# Patient Record
Sex: FEMALE | Race: BLACK OR AFRICAN AMERICAN | NOT HISPANIC OR LATINO | URBAN - METROPOLITAN AREA
[De-identification: names, ages, dates, MRNs, and addresses within clinical notes are randomized per-mention and may not be internally consistent; named-entity substitution may affect disease eponyms.]

---

## 2018-08-01 ENCOUNTER — OUTPATIENT (OUTPATIENT)
Dept: OUTPATIENT SERVICES | Facility: HOSPITAL | Age: 58
LOS: 1 days | End: 2018-08-01
Payer: MEDICAID

## 2018-08-01 PROCEDURE — G9001: CPT

## 2018-08-16 ENCOUNTER — EMERGENCY (EMERGENCY)
Facility: HOSPITAL | Age: 58
LOS: 0 days | Discharge: HOME | End: 2018-08-17
Attending: EMERGENCY MEDICINE
Payer: MEDICAID

## 2018-08-16 VITALS
WEIGHT: 149.91 LBS | RESPIRATION RATE: 17 BRPM | DIASTOLIC BLOOD PRESSURE: 79 MMHG | OXYGEN SATURATION: 95 % | SYSTOLIC BLOOD PRESSURE: 150 MMHG | TEMPERATURE: 98 F | HEART RATE: 86 BPM | HEIGHT: 64.5 IN

## 2018-08-16 DIAGNOSIS — R10.9 UNSPECIFIED ABDOMINAL PAIN: ICD-10-CM

## 2018-08-16 DIAGNOSIS — M54.9 DORSALGIA, UNSPECIFIED: ICD-10-CM

## 2018-08-16 DIAGNOSIS — Z91.040 LATEX ALLERGY STATUS: ICD-10-CM

## 2018-08-16 DIAGNOSIS — R07.9 CHEST PAIN, UNSPECIFIED: ICD-10-CM

## 2018-08-16 DIAGNOSIS — I10 ESSENTIAL (PRIMARY) HYPERTENSION: ICD-10-CM

## 2018-08-16 DIAGNOSIS — Z88.0 ALLERGY STATUS TO PENICILLIN: ICD-10-CM

## 2018-08-16 PROCEDURE — 99284 EMERGENCY DEPT VISIT MOD MDM: CPT | Mod: 25

## 2018-08-16 NOTE — ED PROVIDER NOTE - PHYSICAL EXAMINATION
Gen: Alert, c/o pain  Head: NC, AT, EOMI, normal lids/conjunctiva  ENT: normal hearing, patent oropharynx, MMM  Neck: supple, no tenderness/meningismus, FROM, Trachea midline  Pulm: Bilateral clear BS, normal resp effort, no wheeze/stridor/retractions  CV: RRR, no M/R/G, +dist pulses  Abd: soft, NT/ND, +BS, no guarding/rebound tenderness  Mskel: no edema/erythema/cyanosis  Skin: no rash  Neuro: AAOx3, no sensory/motor deficits

## 2018-08-16 NOTE — ED PROVIDER NOTE - MEDICAL DECISION MAKING DETAILS
Pt improved in ED w meds given, ambulating in ED.  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  Pt eating in ED.  Discussed results and outcome of testing with the patient, given copy as well.  Patient advised to please follow up with their primary care doctor within the next 24 hours and return to the Emergency Department for worsening symptoms or any other concerns.  Patient advised that their doctor may call  to follow up on the specific results of the tests performed today in the emergency department.

## 2018-08-16 NOTE — ED PROVIDER NOTE - OBJECTIVE STATEMENT
Pertinent PMH/PSH/FHx/SHx and Review of Systems contained within:    58yo F w PMH of HTN, s/p lumbar fusion presents to ED c/o exacerbation of back pain radiating to abd & chest.  Denies fever, chills, SOB, vomiting, diarrhea.  Pt states she took her regular home meds w/o relief.    No fever/chills, No photophobia/eye pain/changes in vision, No ear pain/sore throat/dysphagia, No palpitations, no SOB/cough/wheeze/stridor, No abdominal pain, No neck pain, no rash, no changes in neurological status/function.

## 2018-08-17 VITALS
RESPIRATION RATE: 18 BRPM | HEART RATE: 90 BPM | SYSTOLIC BLOOD PRESSURE: 130 MMHG | TEMPERATURE: 99 F | OXYGEN SATURATION: 97 % | DIASTOLIC BLOOD PRESSURE: 71 MMHG

## 2018-08-17 DIAGNOSIS — Z98.1 ARTHRODESIS STATUS: Chronic | ICD-10-CM

## 2018-08-17 LAB
ALBUMIN SERPL ELPH-MCNC: 3.8 G/DL — SIGNIFICANT CHANGE UP (ref 3.3–5)
ALP SERPL-CCNC: 107 U/L — SIGNIFICANT CHANGE UP (ref 40–120)
ALT FLD-CCNC: 23 U/L — SIGNIFICANT CHANGE UP (ref 12–78)
ANION GAP SERPL CALC-SCNC: 10 MMOL/L — SIGNIFICANT CHANGE UP (ref 5–17)
APTT BLD: 28 SEC — SIGNIFICANT CHANGE UP (ref 27.5–37.4)
AST SERPL-CCNC: 22 U/L — SIGNIFICANT CHANGE UP (ref 15–37)
BASOPHILS # BLD AUTO: 0.03 K/UL — SIGNIFICANT CHANGE UP (ref 0–0.2)
BASOPHILS NFR BLD AUTO: 0.3 % — SIGNIFICANT CHANGE UP (ref 0–2)
BILIRUB SERPL-MCNC: 0.2 MG/DL — SIGNIFICANT CHANGE UP (ref 0.2–1.2)
BUN SERPL-MCNC: 20 MG/DL — SIGNIFICANT CHANGE UP (ref 7–23)
CALCIUM SERPL-MCNC: 9 MG/DL — SIGNIFICANT CHANGE UP (ref 8.5–10.1)
CHLORIDE SERPL-SCNC: 106 MMOL/L — SIGNIFICANT CHANGE UP (ref 96–108)
CK MB BLD-MCNC: 1.8 % — SIGNIFICANT CHANGE UP (ref 0–3.5)
CK MB CFR SERPL CALC: 3.6 NG/ML — SIGNIFICANT CHANGE UP (ref 0.5–3.6)
CK SERPL-CCNC: 197 U/L — HIGH (ref 26–192)
CO2 SERPL-SCNC: 29 MMOL/L — SIGNIFICANT CHANGE UP (ref 22–31)
CREAT SERPL-MCNC: 0.86 MG/DL — SIGNIFICANT CHANGE UP (ref 0.5–1.3)
D DIMER BLD IA.RAPID-MCNC: <150 NG/ML DDU — SIGNIFICANT CHANGE UP
EOSINOPHIL # BLD AUTO: 0.03 K/UL — SIGNIFICANT CHANGE UP (ref 0–0.5)
EOSINOPHIL NFR BLD AUTO: 0.3 % — SIGNIFICANT CHANGE UP (ref 0–6)
GLUCOSE SERPL-MCNC: 100 MG/DL — HIGH (ref 70–99)
HCT VFR BLD CALC: 36.9 % — SIGNIFICANT CHANGE UP (ref 34.5–45)
HGB BLD-MCNC: 12.7 G/DL — SIGNIFICANT CHANGE UP (ref 11.5–15.5)
IMM GRANULOCYTES NFR BLD AUTO: 0.3 % — SIGNIFICANT CHANGE UP (ref 0–1.5)
INR BLD: 1 RATIO — SIGNIFICANT CHANGE UP (ref 0.88–1.16)
LYMPHOCYTES # BLD AUTO: 2.42 K/UL — SIGNIFICANT CHANGE UP (ref 1–3.3)
LYMPHOCYTES # BLD AUTO: 27.5 % — SIGNIFICANT CHANGE UP (ref 13–44)
MAGNESIUM SERPL-MCNC: 2.3 MG/DL — SIGNIFICANT CHANGE UP (ref 1.6–2.6)
MCHC RBC-ENTMCNC: 30.9 PG — SIGNIFICANT CHANGE UP (ref 27–34)
MCHC RBC-ENTMCNC: 34.4 GM/DL — SIGNIFICANT CHANGE UP (ref 32–36)
MCV RBC AUTO: 89.8 FL — SIGNIFICANT CHANGE UP (ref 80–100)
MONOCYTES # BLD AUTO: 0.53 K/UL — SIGNIFICANT CHANGE UP (ref 0–0.9)
MONOCYTES NFR BLD AUTO: 6 % — SIGNIFICANT CHANGE UP (ref 2–14)
NEUTROPHILS # BLD AUTO: 5.77 K/UL — SIGNIFICANT CHANGE UP (ref 1.8–7.4)
NEUTROPHILS NFR BLD AUTO: 65.6 % — SIGNIFICANT CHANGE UP (ref 43–77)
NRBC # BLD: 0 /100 WBCS — SIGNIFICANT CHANGE UP (ref 0–0)
PLATELET # BLD AUTO: 328 K/UL — SIGNIFICANT CHANGE UP (ref 150–400)
POTASSIUM SERPL-MCNC: 3.1 MMOL/L — LOW (ref 3.5–5.3)
POTASSIUM SERPL-SCNC: 3.1 MMOL/L — LOW (ref 3.5–5.3)
PROT SERPL-MCNC: 7.7 GM/DL — SIGNIFICANT CHANGE UP (ref 6–8.3)
PROTHROM AB SERPL-ACNC: 10.9 SEC — SIGNIFICANT CHANGE UP (ref 9.8–12.7)
RBC # BLD: 4.11 M/UL — SIGNIFICANT CHANGE UP (ref 3.8–5.2)
RBC # FLD: 12.3 % — SIGNIFICANT CHANGE UP (ref 10.3–14.5)
SODIUM SERPL-SCNC: 145 MMOL/L — SIGNIFICANT CHANGE UP (ref 135–145)
TROPONIN I SERPL-MCNC: <.015 NG/ML — SIGNIFICANT CHANGE UP (ref 0.01–0.04)
TROPONIN I SERPL-MCNC: <.015 NG/ML — SIGNIFICANT CHANGE UP (ref 0.01–0.04)
WBC # BLD: 8.81 K/UL — SIGNIFICANT CHANGE UP (ref 3.8–10.5)
WBC # FLD AUTO: 8.81 K/UL — SIGNIFICANT CHANGE UP (ref 3.8–10.5)

## 2018-08-17 PROCEDURE — 74177 CT ABD & PELVIS W/CONTRAST: CPT | Mod: 26

## 2018-08-17 RX ORDER — POTASSIUM CHLORIDE 20 MEQ
40 PACKET (EA) ORAL ONCE
Qty: 0 | Refills: 0 | Status: COMPLETED | OUTPATIENT
Start: 2018-08-17 | End: 2018-08-17

## 2018-08-17 RX ORDER — KETOROLAC TROMETHAMINE 30 MG/ML
30 SYRINGE (ML) INJECTION ONCE
Qty: 0 | Refills: 0 | Status: DISCONTINUED | OUTPATIENT
Start: 2018-08-17 | End: 2018-08-17

## 2018-08-17 RX ORDER — ACETAMINOPHEN 500 MG
1000 TABLET ORAL ONCE
Qty: 0 | Refills: 0 | Status: COMPLETED | OUTPATIENT
Start: 2018-08-17 | End: 2018-08-17

## 2018-08-17 RX ORDER — DEXAMETHASONE 0.5 MG/5ML
6 ELIXIR ORAL ONCE
Qty: 0 | Refills: 0 | Status: COMPLETED | OUTPATIENT
Start: 2018-08-17 | End: 2018-08-17

## 2018-08-17 RX ADMIN — Medication 101.2 MILLIGRAM(S): at 01:18

## 2018-08-17 RX ADMIN — Medication 30 MILLIGRAM(S): at 07:56

## 2018-08-17 RX ADMIN — Medication 6 MILLIGRAM(S): at 02:00

## 2018-08-17 RX ADMIN — Medication 1000 MILLIGRAM(S): at 06:10

## 2018-08-17 RX ADMIN — Medication 40 MILLIEQUIVALENT(S): at 05:22

## 2018-08-17 RX ADMIN — Medication 30 MILLIGRAM(S): at 01:18

## 2018-08-17 RX ADMIN — Medication 30 MILLIGRAM(S): at 01:40

## 2018-08-17 RX ADMIN — Medication 400 MILLIGRAM(S): at 05:55

## 2018-08-17 NOTE — ED ADULT NURSE REASSESSMENT NOTE - NS ED NURSE REASSESS COMMENT FT1
Pt aware of plan to discharge, paperwork discussed with pt. Iv discontinued. Awaiting transportation services and breakfast.

## 2018-08-17 NOTE — ED ADULT NURSE NOTE - NSIMPLEMENTINTERV_GEN_ALL_ED
Implemented All Universal Safety Interventions:  [place] to call system. Call bell, personal items and telephone within reach. Instruct patient to call for assistance. Room bathroom lighting operational. Non-slip footwear when patient is off stretcher. Physically safe environment: no spills, clutter or unnecessary equipment. Stretcher in lowest position, wheels locked, appropriate side rails in place.

## 2018-08-17 NOTE — ED ADULT NURSE NOTE - OBJECTIVE STATEMENT
Pt a& o x3 BIBA. Pt present with back pain. 0016 pt took 3 Tylenol and tizanidine for pain.  l L2 - s1 fusion is spine 2011, t11 and t12 and L1 ablation of the spine in february 2018. pt states pain worsening today in back radiating across abdomen and chest 10/10. pt states she cannot take opioid medication/,. pt takes tizanidine, meloxicam and 3 g Tylenol.

## 2018-08-23 DIAGNOSIS — Z71.89 OTHER SPECIFIED COUNSELING: ICD-10-CM

## 2018-12-01 ENCOUNTER — OUTPATIENT (OUTPATIENT)
Dept: OUTPATIENT SERVICES | Facility: HOSPITAL | Age: 58
LOS: 1 days | End: 2018-12-01
Payer: MEDICAID

## 2018-12-01 DIAGNOSIS — Z98.1 ARTHRODESIS STATUS: Chronic | ICD-10-CM

## 2018-12-13 ENCOUNTER — INPATIENT (INPATIENT)
Facility: HOSPITAL | Age: 58
LOS: 1 days | Discharge: HOME | End: 2018-12-15
Attending: HOSPITALIST | Admitting: HOSPITALIST
Payer: MEDICAID

## 2018-12-13 VITALS
OXYGEN SATURATION: 97 % | HEART RATE: 89 BPM | TEMPERATURE: 99 F | SYSTOLIC BLOOD PRESSURE: 147 MMHG | DIASTOLIC BLOOD PRESSURE: 86 MMHG | RESPIRATION RATE: 16 BRPM

## 2018-12-13 DIAGNOSIS — Z98.1 ARTHRODESIS STATUS: Chronic | ICD-10-CM

## 2018-12-13 LAB
ALBUMIN SERPL ELPH-MCNC: 4.4 G/DL — SIGNIFICANT CHANGE UP (ref 3.3–5)
ALP SERPL-CCNC: 119 U/L — SIGNIFICANT CHANGE UP (ref 40–120)
ALT FLD-CCNC: 12 U/L — SIGNIFICANT CHANGE UP (ref 4–33)
APPEARANCE UR: CLEAR — SIGNIFICANT CHANGE UP
AST SERPL-CCNC: 20 U/L — SIGNIFICANT CHANGE UP (ref 4–32)
BACTERIA # UR AUTO: SIGNIFICANT CHANGE UP
BASOPHILS # BLD AUTO: 0.04 K/UL — SIGNIFICANT CHANGE UP (ref 0–0.2)
BASOPHILS NFR BLD AUTO: 0.8 % — SIGNIFICANT CHANGE UP (ref 0–2)
BILIRUB SERPL-MCNC: < 0.2 MG/DL — LOW (ref 0.2–1.2)
BILIRUB UR-MCNC: NEGATIVE — SIGNIFICANT CHANGE UP
BLOOD UR QL VISUAL: NEGATIVE — SIGNIFICANT CHANGE UP
BUN SERPL-MCNC: 12 MG/DL — SIGNIFICANT CHANGE UP (ref 7–23)
CALCIUM SERPL-MCNC: 9.7 MG/DL — SIGNIFICANT CHANGE UP (ref 8.4–10.5)
CHLORIDE SERPL-SCNC: 106 MMOL/L — SIGNIFICANT CHANGE UP (ref 98–107)
CO2 SERPL-SCNC: 28 MMOL/L — SIGNIFICANT CHANGE UP (ref 22–31)
COLOR SPEC: COLORLESS — SIGNIFICANT CHANGE UP
CREAT SERPL-MCNC: 0.81 MG/DL — SIGNIFICANT CHANGE UP (ref 0.5–1.3)
EOSINOPHIL # BLD AUTO: 0.14 K/UL — SIGNIFICANT CHANGE UP (ref 0–0.5)
EOSINOPHIL NFR BLD AUTO: 2.7 % — SIGNIFICANT CHANGE UP (ref 0–6)
EPI CELLS # UR: SIGNIFICANT CHANGE UP
GLUCOSE SERPL-MCNC: 92 MG/DL — SIGNIFICANT CHANGE UP (ref 70–99)
GLUCOSE UR-MCNC: NEGATIVE — SIGNIFICANT CHANGE UP
HCT VFR BLD CALC: 42.7 % — SIGNIFICANT CHANGE UP (ref 34.5–45)
HGB BLD-MCNC: 14.1 G/DL — SIGNIFICANT CHANGE UP (ref 11.5–15.5)
IMM GRANULOCYTES # BLD AUTO: 0.01 # — SIGNIFICANT CHANGE UP
IMM GRANULOCYTES NFR BLD AUTO: 0.2 % — SIGNIFICANT CHANGE UP (ref 0–1.5)
KETONES UR-MCNC: NEGATIVE — SIGNIFICANT CHANGE UP
LEUKOCYTE ESTERASE UR-ACNC: SIGNIFICANT CHANGE UP
LYMPHOCYTES # BLD AUTO: 2.29 K/UL — SIGNIFICANT CHANGE UP (ref 1–3.3)
LYMPHOCYTES # BLD AUTO: 43.9 % — SIGNIFICANT CHANGE UP (ref 13–44)
MCHC RBC-ENTMCNC: 30.5 PG — SIGNIFICANT CHANGE UP (ref 27–34)
MCHC RBC-ENTMCNC: 33 % — SIGNIFICANT CHANGE UP (ref 32–36)
MCV RBC AUTO: 92.2 FL — SIGNIFICANT CHANGE UP (ref 80–100)
MONOCYTES # BLD AUTO: 0.39 K/UL — SIGNIFICANT CHANGE UP (ref 0–0.9)
MONOCYTES NFR BLD AUTO: 7.5 % — SIGNIFICANT CHANGE UP (ref 2–14)
NEUTROPHILS # BLD AUTO: 2.35 K/UL — SIGNIFICANT CHANGE UP (ref 1.8–7.4)
NEUTROPHILS NFR BLD AUTO: 44.9 % — SIGNIFICANT CHANGE UP (ref 43–77)
NITRITE UR-MCNC: NEGATIVE — SIGNIFICANT CHANGE UP
NRBC # FLD: 0 — SIGNIFICANT CHANGE UP
PH UR: 7.5 — SIGNIFICANT CHANGE UP (ref 5–8)
PLATELET # BLD AUTO: 280 K/UL — SIGNIFICANT CHANGE UP (ref 150–400)
PMV BLD: 8.6 FL — SIGNIFICANT CHANGE UP (ref 7–13)
POTASSIUM SERPL-MCNC: 3.4 MMOL/L — LOW (ref 3.5–5.3)
POTASSIUM SERPL-SCNC: 3.4 MMOL/L — LOW (ref 3.5–5.3)
PROT SERPL-MCNC: 7.7 G/DL — SIGNIFICANT CHANGE UP (ref 6–8.3)
PROT UR-MCNC: NEGATIVE — SIGNIFICANT CHANGE UP
RBC # BLD: 4.63 M/UL — SIGNIFICANT CHANGE UP (ref 3.8–5.2)
RBC # FLD: 12.5 % — SIGNIFICANT CHANGE UP (ref 10.3–14.5)
RBC CASTS # UR COMP ASSIST: SIGNIFICANT CHANGE UP (ref 0–?)
SODIUM SERPL-SCNC: 144 MMOL/L — SIGNIFICANT CHANGE UP (ref 135–145)
SP GR SPEC: 1.01 — SIGNIFICANT CHANGE UP (ref 1–1.04)
UROBILINOGEN FLD QL: NORMAL — SIGNIFICANT CHANGE UP
WBC # BLD: 5.22 K/UL — SIGNIFICANT CHANGE UP (ref 3.8–10.5)
WBC # FLD AUTO: 5.22 K/UL — SIGNIFICANT CHANGE UP (ref 3.8–10.5)
WBC UR QL: SIGNIFICANT CHANGE UP (ref 0–?)

## 2018-12-13 PROCEDURE — 74177 CT ABD & PELVIS W/CONTRAST: CPT | Mod: 26

## 2018-12-13 RX ORDER — MORPHINE SULFATE 50 MG/1
4 CAPSULE, EXTENDED RELEASE ORAL ONCE
Qty: 0 | Refills: 0 | Status: DISCONTINUED | OUTPATIENT
Start: 2018-12-13 | End: 2018-12-13

## 2018-12-13 RX ORDER — OXYCODONE AND ACETAMINOPHEN 5; 325 MG/1; MG/1
1 TABLET ORAL ONCE
Qty: 0 | Refills: 0 | Status: DISCONTINUED | OUTPATIENT
Start: 2018-12-13 | End: 2018-12-13

## 2018-12-13 RX ORDER — CIPROFLOXACIN LACTATE 400MG/40ML
400 VIAL (ML) INTRAVENOUS ONCE
Qty: 0 | Refills: 0 | Status: COMPLETED | OUTPATIENT
Start: 2018-12-13 | End: 2018-12-13

## 2018-12-13 RX ORDER — KETOROLAC TROMETHAMINE 30 MG/ML
30 SYRINGE (ML) INJECTION ONCE
Qty: 0 | Refills: 0 | Status: DISCONTINUED | OUTPATIENT
Start: 2018-12-13 | End: 2018-12-13

## 2018-12-13 RX ORDER — LIDOCAINE 4 G/100G
1 CREAM TOPICAL ONCE
Qty: 0 | Refills: 0 | Status: COMPLETED | OUTPATIENT
Start: 2018-12-13 | End: 2018-12-13

## 2018-12-13 RX ORDER — ACETAMINOPHEN 500 MG
1000 TABLET ORAL ONCE
Qty: 0 | Refills: 0 | Status: COMPLETED | OUTPATIENT
Start: 2018-12-13 | End: 2018-12-14

## 2018-12-13 RX ADMIN — LIDOCAINE 1 PATCH: 4 CREAM TOPICAL at 19:35

## 2018-12-13 RX ADMIN — Medication 30 MILLIGRAM(S): at 19:35

## 2018-12-13 NOTE — ED ADULT NURSE NOTE - OBJECTIVE STATEMENT
pt received to intake room #7 with c/o back pain causing difficulty with ambulating. pt able to be assisted to wheelchair to use bathroom. pt placed on bedpan for urine sample in room. +ROM to b/l lower extremities. IV placed, labs drawn and sent. medication given as per MDs orders. pt to go to [facility].

## 2018-12-13 NOTE — ED PROVIDER NOTE - ATTENDING CONTRIBUTION TO CARE
Attending Statement: I have reviewed and agree with all pertinent clinical information, including history and physical exam and agree with treatment plan of the PA, except as noted.  57yo F hx of HTN, DM, asthma, chronic back pain sp spinal fusion BIBEMS co acute on chronic back pain x 2 days. States  "my back pain is worse  this time its affecting my LEgs" pt states she is unable to stand up straight due to pain. Has been on the floor for last two days. Denies any numbness or weakness. Denies any urine or bowel incontinence. no fall/trauma. Pain located on the right lower back radiating down the right buttocks. Pt states she is followed by pain management and get epidural every couple of weeks. Due to have "injection on 12-20-18" hasn't had any injection recently. Taking tylenol and meloxicam with min relief. not on AC.  Denies any abdominal pain. no dysuria no urgency   Vital signs noted. pt laying in bed with knees bend on the phone no distress. Ao3. nontoxic. looks comfortable. mmm. supple neck. no sign of facial trauma. normal S1-S2 No resp distress. able to speak in full and clear sentences. no wheeze, rales or stridor. soft no ecchymosis or bruising of abdomen, tender on the  right mid axillary line no rebound or guarding. no cvat. old vertical surgical scar well healed. no midline spine tenderness thoracic or lumbar spine no step off neg straight leg bl. pt able to turn herself in bed. able to straighten her LEGs and move her lower ext without assistance. nl flexion and extension bl. nl sensation bl lower ext.   plan labs, urine, ct abdomen, pain med and re assess

## 2018-12-13 NOTE — ED PROVIDER NOTE - MEDICAL DECISION MAKING DETAILS
Pt is a 57 y/o F nonsmoker PMHx asthma, HTN, DM, spinal fusion L4-S1 p/w acute on chronic back pain x 2 days. -- likely exacerbation of chronic back pain, no red flags -- pain control

## 2018-12-13 NOTE — ED PROVIDER NOTE - NONTENDER LOCATION
periumbilical/left lower quadrant/right lower quadrant/right upper quadrant/suprapubic/right costovertebral angle/left upper quadrant/umbilical/left costovertebral angle

## 2018-12-13 NOTE — ED ADULT NURSE NOTE - NSIMPLEMENTINTERV_GEN_ALL_ED
Implemented All Universal Safety Interventions:  [place] to call system. Call bell, personal items and telephone within reach. Instruct patient to call for assistance. Room bathroom lighting operational. Non-slip footwear when patient is off stretcher. Physically safe environment: no spills, clutter or unnecessary equipment. Stretcher in lowest position, wheels locked, appropriate side rails in place.

## 2018-12-13 NOTE — ED PROVIDER NOTE - PROGRESS NOTE DETAILS
pt pending ct to be done. will treat urine. endorse to Dr Sweet. MANA TELLES:  Pt unable to ambulate independently.  CT negative for any new acute pathology.  Pt admitted to Dr. Angulo.  MAR text paged at this time.

## 2018-12-13 NOTE — ED PROVIDER NOTE - OBJECTIVE STATEMENT
Pt is a 59 y/o F nonsmoker PMHx asthma, HTN, DM, spinal fusion L4-S1 p/w acute on chronic back pain x 2 days.  Pt states she has had ~ 2 years of chronic lower back pain radiating to right flank.  Pt states she had exacerbation of same pain for past 2 days, which worsens when trying to stand and walk.  Pt states she has been unable to walk 2/2 pain, but is otherwise able to move legs without any issues.  Pt states pain minimally improved with Meloxicam and Tylenol.  Pt notified pain management specialist Dr. Joe who directed pt to ED for evaluation and management.  Pt denies any fevers, chills, numbness, weakness, fecal/urinary incontinence, difficulty urinating or passing bowel movements, trauma, falls, illicit drug use, or any other specific complaints.

## 2018-12-14 DIAGNOSIS — R26.2 DIFFICULTY IN WALKING, NOT ELSEWHERE CLASSIFIED: ICD-10-CM

## 2018-12-14 DIAGNOSIS — N39.0 URINARY TRACT INFECTION, SITE NOT SPECIFIED: ICD-10-CM

## 2018-12-14 DIAGNOSIS — Z29.9 ENCOUNTER FOR PROPHYLACTIC MEASURES, UNSPECIFIED: ICD-10-CM

## 2018-12-14 DIAGNOSIS — M54.9 DORSALGIA, UNSPECIFIED: ICD-10-CM

## 2018-12-14 DIAGNOSIS — I10 ESSENTIAL (PRIMARY) HYPERTENSION: ICD-10-CM

## 2018-12-14 DIAGNOSIS — J45.909 UNSPECIFIED ASTHMA, UNCOMPLICATED: ICD-10-CM

## 2018-12-14 DIAGNOSIS — Z98.890 OTHER SPECIFIED POSTPROCEDURAL STATES: Chronic | ICD-10-CM

## 2018-12-14 DIAGNOSIS — G35 MULTIPLE SCLEROSIS: ICD-10-CM

## 2018-12-14 PROBLEM — M77.9 ENTHESOPATHY, UNSPECIFIED: Chronic | Status: ACTIVE | Noted: 2018-08-17

## 2018-12-14 PROBLEM — H26.9 UNSPECIFIED CATARACT: Chronic | Status: ACTIVE | Noted: 2018-08-17

## 2018-12-14 PROCEDURE — 99223 1ST HOSP IP/OBS HIGH 75: CPT

## 2018-12-14 PROCEDURE — 99221 1ST HOSP IP/OBS SF/LOW 40: CPT | Mod: GC

## 2018-12-14 RX ORDER — LIDOCAINE 4 G/100G
1 CREAM TOPICAL DAILY
Qty: 0 | Refills: 0 | Status: DISCONTINUED | OUTPATIENT
Start: 2018-12-14 | End: 2018-12-15

## 2018-12-14 RX ORDER — FAMOTIDINE 10 MG/ML
40 INJECTION INTRAVENOUS
Qty: 0 | Refills: 0 | Status: DISCONTINUED | OUTPATIENT
Start: 2018-12-14 | End: 2018-12-14

## 2018-12-14 RX ORDER — NIFEDIPINE 30 MG
1 TABLET, EXTENDED RELEASE 24 HR ORAL
Qty: 0 | Refills: 0 | COMMUNITY

## 2018-12-14 RX ORDER — CYCLOBENZAPRINE HYDROCHLORIDE 10 MG/1
10 TABLET, FILM COATED ORAL THREE TIMES A DAY
Qty: 0 | Refills: 0 | Status: DISCONTINUED | OUTPATIENT
Start: 2018-12-14 | End: 2018-12-15

## 2018-12-14 RX ORDER — SENNA PLUS 8.6 MG/1
2 TABLET ORAL AT BEDTIME
Qty: 0 | Refills: 0 | Status: DISCONTINUED | OUTPATIENT
Start: 2018-12-14 | End: 2018-12-15

## 2018-12-14 RX ORDER — KETOROLAC TROMETHAMINE 30 MG/ML
15 SYRINGE (ML) INJECTION EVERY 6 HOURS
Qty: 0 | Refills: 0 | Status: DISCONTINUED | OUTPATIENT
Start: 2018-12-14 | End: 2018-12-15

## 2018-12-14 RX ORDER — INFLUENZA VIRUS VACCINE 15; 15; 15; 15 UG/.5ML; UG/.5ML; UG/.5ML; UG/.5ML
0.5 SUSPENSION INTRAMUSCULAR ONCE
Qty: 0 | Refills: 0 | Status: DISCONTINUED | OUTPATIENT
Start: 2018-12-14 | End: 2018-12-15

## 2018-12-14 RX ORDER — ACETAMINOPHEN 500 MG
2 TABLET ORAL
Qty: 0 | Refills: 0 | COMMUNITY

## 2018-12-14 RX ORDER — ACETAMINOPHEN 500 MG
1000 TABLET ORAL ONCE
Qty: 0 | Refills: 0 | Status: COMPLETED | OUTPATIENT
Start: 2018-12-14 | End: 2018-12-14

## 2018-12-14 RX ORDER — DOCUSATE SODIUM 100 MG
100 CAPSULE ORAL THREE TIMES A DAY
Qty: 0 | Refills: 0 | Status: DISCONTINUED | OUTPATIENT
Start: 2018-12-14 | End: 2018-12-15

## 2018-12-14 RX ORDER — CYCLOBENZAPRINE HYDROCHLORIDE 10 MG/1
10 TABLET, FILM COATED ORAL THREE TIMES A DAY
Qty: 0 | Refills: 0 | Status: DISCONTINUED | OUTPATIENT
Start: 2018-12-14 | End: 2018-12-14

## 2018-12-14 RX ORDER — HYDROXYZINE HCL 10 MG
1 TABLET ORAL
Qty: 0 | Refills: 0 | COMMUNITY

## 2018-12-14 RX ORDER — LORATADINE 10 MG/1
1 TABLET ORAL
Qty: 0 | Refills: 0 | COMMUNITY

## 2018-12-14 RX ORDER — FLUTICASONE PROPIONATE 220 MCG
1 AEROSOL WITH ADAPTER (GRAM) INHALATION
Qty: 0 | Refills: 0 | COMMUNITY

## 2018-12-14 RX ORDER — SODIUM CHLORIDE 9 MG/ML
1000 INJECTION INTRAMUSCULAR; INTRAVENOUS; SUBCUTANEOUS
Qty: 0 | Refills: 0 | Status: COMPLETED | OUTPATIENT
Start: 2018-12-14 | End: 2018-12-14

## 2018-12-14 RX ORDER — NIFEDIPINE 30 MG
90 TABLET, EXTENDED RELEASE 24 HR ORAL AT BEDTIME
Qty: 0 | Refills: 0 | Status: DISCONTINUED | OUTPATIENT
Start: 2018-12-14 | End: 2018-12-15

## 2018-12-14 RX ORDER — OMEPRAZOLE 10 MG/1
1 CAPSULE, DELAYED RELEASE ORAL
Qty: 0 | Refills: 0 | COMMUNITY

## 2018-12-14 RX ORDER — MELOXICAM 15 MG/1
1 TABLET ORAL
Qty: 0 | Refills: 0 | COMMUNITY

## 2018-12-14 RX ORDER — FAMOTIDINE 10 MG/ML
20 INJECTION INTRAVENOUS
Qty: 0 | Refills: 0 | Status: DISCONTINUED | OUTPATIENT
Start: 2018-12-14 | End: 2018-12-15

## 2018-12-14 RX ORDER — CIPROFLOXACIN LACTATE 400MG/40ML
250 VIAL (ML) INTRAVENOUS EVERY 12 HOURS
Qty: 0 | Refills: 0 | Status: DISCONTINUED | OUTPATIENT
Start: 2018-12-14 | End: 2018-12-15

## 2018-12-14 RX ORDER — KETOROLAC TROMETHAMINE 30 MG/ML
30 SYRINGE (ML) INJECTION ONCE
Qty: 0 | Refills: 0 | Status: DISCONTINUED | OUTPATIENT
Start: 2018-12-14 | End: 2018-12-14

## 2018-12-14 RX ORDER — ACETAMINOPHEN 500 MG
650 TABLET ORAL EVERY 6 HOURS
Qty: 0 | Refills: 0 | Status: DISCONTINUED | OUTPATIENT
Start: 2018-12-14 | End: 2018-12-15

## 2018-12-14 RX ORDER — POLYETHYLENE GLYCOL 3350 17 G/17G
17 POWDER, FOR SOLUTION ORAL DAILY
Qty: 0 | Refills: 0 | Status: DISCONTINUED | OUTPATIENT
Start: 2018-12-14 | End: 2018-12-15

## 2018-12-14 RX ADMIN — LIDOCAINE 1 PATCH: 4 CREAM TOPICAL at 12:17

## 2018-12-14 RX ADMIN — Medication 400 MILLIGRAM(S): at 00:04

## 2018-12-14 RX ADMIN — Medication 250 MILLIGRAM(S): at 14:32

## 2018-12-14 RX ADMIN — LIDOCAINE 1 PATCH: 4 CREAM TOPICAL at 07:23

## 2018-12-14 RX ADMIN — Medication 30 MILLIGRAM(S): at 12:17

## 2018-12-14 RX ADMIN — Medication 400 MILLIGRAM(S): at 14:32

## 2018-12-14 RX ADMIN — Medication 90 MILLIGRAM(S): at 22:12

## 2018-12-14 RX ADMIN — Medication 200 MILLIGRAM(S): at 00:24

## 2018-12-14 RX ADMIN — CYCLOBENZAPRINE HYDROCHLORIDE 10 MILLIGRAM(S): 10 TABLET, FILM COATED ORAL at 12:14

## 2018-12-14 RX ADMIN — Medication 100 MILLIGRAM(S): at 22:12

## 2018-12-14 RX ADMIN — POLYETHYLENE GLYCOL 3350 17 GRAM(S): 17 POWDER, FOR SOLUTION ORAL at 22:13

## 2018-12-14 RX ADMIN — Medication 1000 MILLIGRAM(S): at 14:47

## 2018-12-14 RX ADMIN — SODIUM CHLORIDE 125 MILLILITER(S): 9 INJECTION INTRAMUSCULAR; INTRAVENOUS; SUBCUTANEOUS at 12:18

## 2018-12-14 RX ADMIN — Medication 15 MILLIGRAM(S): at 21:04

## 2018-12-14 RX ADMIN — SENNA PLUS 2 TABLET(S): 8.6 TABLET ORAL at 22:12

## 2018-12-14 RX ADMIN — CYCLOBENZAPRINE HYDROCHLORIDE 10 MILLIGRAM(S): 10 TABLET, FILM COATED ORAL at 22:12

## 2018-12-14 RX ADMIN — FAMOTIDINE 20 MILLIGRAM(S): 10 INJECTION INTRAVENOUS at 14:32

## 2018-12-14 RX ADMIN — Medication 15 MILLIGRAM(S): at 21:15

## 2018-12-14 NOTE — H&P ADULT - PROBLEM SELECTOR PLAN 6
Not in clinical asthma exacerbation, c/w Flovent as previously prescribed and Xopenex (not albuterol - gets tachycardic) PRN

## 2018-12-14 NOTE — H&P ADULT - ASSESSMENT
58F hx of Hypertension, Asthma, L2-S1 Disc Herniations s/p Spinal Fusion Surgery, R shoulder surgery being admitted for acute on chronic intractable back pain 58F hx of Hypertension, Asthma, MS, L2-S1 Disc Herniations s/p Spinal Fusion Surgery, R shoulder surgery being admitted for acute on chronic intractable back pain

## 2018-12-14 NOTE — CONSULT NOTE ADULT - SUBJECTIVE AND OBJECTIVE BOX
Neurology Consult    Reason for consult: Acute on chronic back pain    HPI:  58y Female PMH Hypertension, Asthma, Multiple Sclerosis, L2-S1 Disc Herniations s/p Spinal Fusion Surgery, R shoulder surgery presents to [facility] ED for acute on chronic back pain.     REVIEW OF SYSTEMS:  As above    MEDICATIONS  acetaminophen   Tablet .. 650 milliGRAM(s) Oral every 6 hours PRN  ciprofloxacin     Tablet 250 milliGRAM(s) Oral every 12 hours  cyclobenzaprine 10 milliGRAM(s) Oral three times a day PRN  famotidine    Tablet 40 milliGRAM(s) Oral two times a day  influenza   Vaccine 0.5 milliLiter(s) IntraMuscular once  ketorolac   Injectable 15 milliGRAM(s) IV Push every 6 hours PRN  lidocaine   Patch 1 Patch Transdermal daily  NIFEdipine XL 90 milliGRAM(s) Oral at bedtime    PMH: Multiple sclerosis  Cataract  Tendonitis  HTN (hypertension)  Asthma    PSH: History of shoulder surgery  History of arthroscopic knee surgery  History of lumbar fusion    FAMILY HISTORY:  Family history of pancreatic cancer (Father)  Family history of breast cancer in mother (Mother)    No history of dementia, strokes, or seizures     SOCIAL HISTORY:  No history of tobacco or alcohol use     Allergies    aspirin (Anaphylaxis)  benzodiazepines (Angioedema)  gabapentin (Angioedema)  latex (Anaphylaxis)  Lyrica (Angioedema)  penicillins (Anaphylaxis)    Intolerances    Vital Signs Last 24 Hrs  T(C): 36.7 (14 Dec 2018 03:40), Max: 37.1 (14 Dec 2018 01:07)  T(F): 98.1 (14 Dec 2018 03:40), Max: 98.7 (14 Dec 2018 01:07)  HR: 71 (14 Dec 2018 03:40) (71 - 89)  BP: 140/82 (14 Dec 2018 03:40) (120/80 - 147/86)  BP(mean): --  RR: 18 (14 Dec 2018 03:40) (16 - 18)  SpO2: 100% (14 Dec 2018 03:40) (97% - 100%)    Neurological Examination:    Mental Status: Patient is alert, awake, oriented X3. Patient is fluent, no dysarthria, no aphasia. Follows commands well and able to answer questions appropriately. Mood and affect normal.    Cranial Nerves: PERRL, EOMI, visual field intact, V1-V3 intact, no gross facial asymmetry, tongue/uvula midline    Motor Exam: No drift  Right upper extremity: 5/5  Left upper extremity: 5/5  Right lower extremity: 5/5  Left lower extremity: 5/5    Normal bulk/tone    Sensory: Intact to light touch and pinprick bilaterally. No extinction    Coordination: Finger to nose intact bilaterally     Gait: Normal      Reflexes: Bilateral 2+ Biceps, Brachial, Patellar, Ankle  Plantar: Down bilateral    GENERAL: No acute distress  HEENT:  Normocephalic, atraumatic  EXTREMITIES: No edema, clubbing, cyanosis  MUSCULOSKELETAL: Normal range of motion  SKIN: No rashes    LABS:  CBC Full  -  ( 13 Dec 2018 19:17 )  WBC Count : 5.22 K/uL  Hemoglobin : 14.1 g/dL  Hematocrit : 42.7 %  Platelet Count - Automated : 280 K/uL  Mean Cell Volume : 92.2 fL  Mean Cell Hemoglobin : 30.5 pg  Mean Cell Hemoglobin Concentration : 33.0 %  Auto Neutrophil # : 2.35 K/uL  Auto Lymphocyte # : 2.29 K/uL  Auto Monocyte # : 0.39 K/uL  Auto Eosinophil # : 0.14 K/uL  Auto Basophil # : 0.04 K/uL  Auto Neutrophil % : 44.9 %  Auto Lymphocyte % : 43.9 %  Auto Monocyte % : 7.5 %  Auto Eosinophil % : 2.7 %  Auto Basophil % : 0.8 %    Urinalysis Basic - ( 13 Dec 2018 19:29 )    Color: COLORLESS / Appearance: CLEAR / S.010 / pH: 7.5  Gluc: NEGATIVE / Ketone: NEGATIVE  / Bili: NEGATIVE / Urobili: NORMAL   Blood: NEGATIVE / Protein: NEGATIVE / Nitrite: NEGATIVE   Leuk Esterase: LARGE / RBC: 0-2 / WBC 25-50   Sq Epi: x / Non Sq Epi: SMALL / Bacteria: SMALL          144  |  106  |  12  ----------------------------<  92  3.4<L>   |  28  |  0.81    Ca    9.7      13 Dec 2018 19:17    TPro  7.7  /  Alb  4.4  /  TBili  < 0.2<L>  /  DBili  x   /  AST  20  /  ALT  12  /  AlkPhos  119  12-13    LIVER FUNCTIONS - ( 13 Dec 2018 19:17 )  Alb: 4.4 g/dL / Pro: 7.7 g/dL / ALK PHOS: 119 u/L / ALT: 12 u/L / AST: 20 u/L / GGT: x           Hemoglobin A1C:     RADIOLOGY:  CT head:  MRI: Neurology Consult    Reason for consult: Acute on chronic back pain    HPI:  58y Female PMH Hypertension, Asthma, Multiple Sclerosis, L2-S1 Disc Herniations s/p Spinal Fusion Surgery, R shoulder surgery presents to [facility] ED for acute on chronic back pain. Patient had a flare up of back pain two days ago, has had difficulty with ambulation due to the pain and has needed to crawl around the house since. As per patient, has had back pain for several years prior to current flare up, has herniated disk in the lower thoracic spine, current pain consistent with pain from back herniation. Patient also with hx of multiple sclerosis diagnosed one year prior, lesions in the cervical spine, has had numbness bilaterally below the level of the neck.     REVIEW OF SYSTEMS:  As above    MEDICATIONS  acetaminophen   Tablet .. 650 milliGRAM(s) Oral every 6 hours PRN  ciprofloxacin     Tablet 250 milliGRAM(s) Oral every 12 hours  cyclobenzaprine 10 milliGRAM(s) Oral three times a day PRN  famotidine    Tablet 40 milliGRAM(s) Oral two times a day  influenza   Vaccine 0.5 milliLiter(s) IntraMuscular once  ketorolac   Injectable 15 milliGRAM(s) IV Push every 6 hours PRN  lidocaine   Patch 1 Patch Transdermal daily  NIFEdipine XL 90 milliGRAM(s) Oral at bedtime    PMH: Multiple sclerosis  Cataract  Tendonitis  HTN (hypertension)  Asthma    PSH: History of shoulder surgery  History of arthroscopic knee surgery  History of lumbar fusion    FAMILY HISTORY:  Family history of pancreatic cancer (Father)  Family history of breast cancer in mother (Mother)    No history of dementia, strokes, or seizures     SOCIAL HISTORY:  No history of tobacco or alcohol use     Allergies    aspirin (Anaphylaxis)  benzodiazepines (Angioedema)  gabapentin (Angioedema)  latex (Anaphylaxis)  Lyrica (Angioedema)  penicillins (Anaphylaxis)    Intolerances    Vital Signs Last 24 Hrs  T(C): 36.7 (14 Dec 2018 03:40), Max: 37.1 (14 Dec 2018 01:07)  T(F): 98.1 (14 Dec 2018 03:40), Max: 98.7 (14 Dec 2018 01:07)  HR: 71 (14 Dec 2018 03:40) (71 - 89)  BP: 140/82 (14 Dec 2018 03:40) (120/80 - 147/86)  BP(mean): --  RR: 18 (14 Dec 2018 03:40) (16 - 18)  SpO2: 100% (14 Dec 2018 03:40) (97% - 100%)    Neurological Examination:    Mental Status: Patient is alert, awake, oriented X3. Patient is fluent, no dysarthria, no aphasia. Follows commands well and able to answer questions appropriately. Mood and affect normal.    Cranial Nerves: PERRL, EOMI, visual field intact, V1-V3 intact, no gross facial asymmetry, tongue/uvula midline    Motor Exam: No drift  Right upper extremity: 5/5  Left upper extremity: 5/5  Right lower extremity: 5/5  Left lower extremity: 5/5    Normal bulk/tone    Sensory: Intact to light touch bilaterally. No extinction    Coordination: Finger to nose intact bilaterally     Reflexes: Bilateral 1+ Biceps, Brachial, Patellar, Ankle  Plantar: Down bilateral    GENERAL: No acute distress  HEENT:  Normocephalic, atraumatic  EXTREMITIES: No edema, clubbing, cyanosis  MUSCULOSKELETAL: Normal range of motion  SKIN: No rashes    LABS:  CBC Full  -  ( 13 Dec 2018 19:17 )  WBC Count : 5.22 K/uL  Hemoglobin : 14.1 g/dL  Hematocrit : 42.7 %  Platelet Count - Automated : 280 K/uL  Mean Cell Volume : 92.2 fL  Mean Cell Hemoglobin : 30.5 pg  Mean Cell Hemoglobin Concentration : 33.0 %  Auto Neutrophil # : 2.35 K/uL  Auto Lymphocyte # : 2.29 K/uL  Auto Monocyte # : 0.39 K/uL  Auto Eosinophil # : 0.14 K/uL  Auto Basophil # : 0.04 K/uL  Auto Neutrophil % : 44.9 %  Auto Lymphocyte % : 43.9 %  Auto Monocyte % : 7.5 %  Auto Eosinophil % : 2.7 %  Auto Basophil % : 0.8 %    Urinalysis Basic - ( 13 Dec 2018 19:29 )    Color: COLORLESS / Appearance: CLEAR / S.010 / pH: 7.5  Gluc: NEGATIVE / Ketone: NEGATIVE  / Bili: NEGATIVE / Urobili: NORMAL   Blood: NEGATIVE / Protein: NEGATIVE / Nitrite: NEGATIVE   Leuk Esterase: LARGE / RBC: 0-2 / WBC 25-50   Sq Epi: x / Non Sq Epi: SMALL / Bacteria: SMALL          144  |  106  |  12  ----------------------------<  92  3.4<L>   |  28  |  0.81    Ca    9.7      13 Dec 2018 19:17    TPro  7.7  /  Alb  4.4  /  TBili  < 0.2<L>  /  DBili  x   /  AST  20  /  ALT  12  /  AlkPhos  119  12-13    LIVER FUNCTIONS - ( 13 Dec 2018 19:17 )  Alb: 4.4 g/dL / Pro: 7.7 g/dL / ALK PHOS: 119 u/L / ALT: 12 u/L / AST: 20 u/L / GGT: x           Hemoglobin A1C:     RADIOLOGY:  CT head:  MRI:

## 2018-12-14 NOTE — ED ADULT NURSE REASSESSMENT NOTE - NS ED NURSE REASSESS COMMENT FT1
Pain reassessment and vital signs as noted in flow sheet. Pt states she has partial relief from pain but is feeling better. No complaints of chest pain, headache, nausea, dizziness, vomiting  SOB, fever, chills verbalized.. Pt resting comfortably in bed. NAD. will continue to monitor.

## 2018-12-14 NOTE — PHYSICAL THERAPY INITIAL EVALUATION ADULT - PERTINENT HX OF CURRENT PROBLEM, REHAB EVAL
Patient is a 57 y/o female with a PMH including MS, asthma, HTN and L2-S1 Disc Herniations admitted with dorsalgia.

## 2018-12-14 NOTE — H&P ADULT - NSHPLABSRESULTS_GEN_ALL_CORE
CBC Full  -  ( 13 Dec 2018 19:17 )  WBC Count : 5.22 K/uL  Hemoglobin : 14.1 g/dL  Hematocrit : 42.7 %  Platelet Count - Automated : 280 K/uL  Mean Cell Volume : 92.2 fL  Mean Cell Hemoglobin : 30.5 pg  Mean Cell Hemoglobin Concentration : 33.0 %  Auto Neutrophil # : 2.35 K/uL  Auto Lymphocyte # : 2.29 K/uL  Auto Monocyte # : 0.39 K/uL  Auto Eosinophil # : 0.14 K/uL  Auto Basophil # : 0.04 K/uL  Auto Neutrophil % : 44.9 %  Auto Lymphocyte % : 43.9 %  Auto Monocyte % : 7.5 %  Auto Eosinophil % : 2.7 %  Auto Basophil % : 0.8 %    12-13    144  |  106  |  12  ----------------------------<  92  3.4<L>   |  28  |  0.81    Ca    9.7      13 Dec 2018 19:17    TPro  7.7  /  Alb  4.4  /  TBili  < 0.2<L>  /  DBili  x   /  AST  20  /  ALT  12  /  AlkPhos  119  12-13    Urinalysis (12.13.18 @ 19:29)    Color: COLORLESS    Urine Appearance: CLEAR    Glucose: NEGATIVE    Bilirubin: NEGATIVE    Ketone - Urine: NEGATIVE    Specific Gravity: 1.010    Blood: NEGATIVE    pH - Urine: 7.5    Protein, Urine: NEGATIVE    Urobilinogen: NORMAL    Nitrite: NEGATIVE    Leukocyte Esterase Concentration: LARGE    Red Blood Cell - Urine: 0-2    White Blood Cell - Urine: 25-50    Epithelial Cells: SMALL    Bacteria: SMALL    < from: CT Abdomen and Pelvis w/ Oral Cont and w/ IV Cont (12.13.18 @ 23:36) >  No acute findings to account for the patient's symptoms, specifically   normal appendix.  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 No midgut volvulus or bowel obstruction. Stool-filled colon   and rectum, concerning for constipation. No wall thickening or   inflammatory changes.   Degenerative changes of the spine and rightward lumbar spine   curvature. Status post L3-L5 posteriorspinal fusion with L4 laminectomy,   metallic streak artifact limits detailed evaluation of intraspinal   contents and surrounding structures. Grade 1 anterolisthesis of L4 on L5.  < end of copied text >

## 2018-12-14 NOTE — H&P ADULT - HISTORY OF PRESENT ILLNESS
58F hx of Hypertension, Asthma, L2-S1 Disc Herniations s/p Spinal Fusion Surgery, R shoulder surgery presents to [facility] ED for acute on chronic back pain. As per patient, has history of multiple orthopedic injuries requiring surgical repair - torn knee meniscus, R shoulder arthroscopic repair, L2-S1 spinal fusion, b/l buniectomies of feet. Because of her chronic back pain and orthopedic issues patient at baseline goes to pain management at regular intervals for injections, with meloxicam & tizanidine as maintenance pain medication. Patient says she had a random flare up of her back pain 2 days ago that has left her "crawling on the floor." Patient says the pain is primarily when she stands/is weight bearing. When seated or lying down her pain is relieved. It is primarily a severe back spasm pain that radiates to both legs b/l. This pain makes her lower extremeties "weak" and thus when these flares happen as in the last 2 days she gets around her house by crawling. No recent trauma, fevers, chills, dysuria, chest pain, dyspnea, cough, nausea, vomiting, diarrhea, constipation. 58F hx of Hypertension, Asthma, Multiple Sclerosis, L2-S1 Disc Herniations s/p Spinal Fusion Surgery, R shoulder surgery presents to [facility] ED for acute on chronic back pain. As per patient, has history of multiple orthopedic injuries requiring surgical repair - torn knee meniscus, R shoulder arthroscopic repair, L2-S1 spinal fusion, b/l buniectomies of feet. Because of her chronic back pain and orthopedic issues patient at baseline goes to pain management at regular intervals for injections, with meloxicam & tizanidine as maintenance pain medication. Patient says she had a random flare up of her back pain 2 days ago that has left her "crawling on the floor." Patient says the pain is primarily when she stands/is weight bearing. When seated or lying down her pain is relieved. It is primarily a severe back spasm pain that radiates to both legs b/l. This pain makes her lower extremeties "weak" and thus when these flares happen as in the last 2 days she gets around her house by crawling. No recent trauma, fevers, chills, dysuria, chest pain, dyspnea, cough, nausea, vomiting, diarrhea, constipation. 58F hx of Hypertension, Asthma, Multiple Sclerosis, L2-S1 Disc Herniations s/p Spinal Fusion Surgery, R shoulder surgery presents to [facility] ED for acute on chronic back pain. As per patient, has history of multiple orthopedic injuries requiring surgical repair - torn knee meniscus, R shoulder arthroscopic repair, L2-S1 spinal fusion, b/l buniectomies of feet. Because of her chronic back pain and orthopedic issues patient at baseline goes to pain management at regular intervals for injections, with meloxicam & tizanidine as maintenance pain medication. Patient says she had a random flare up of her back pain 2 days ago that has left her "crawling on the floor." Patient says the pain is primarily when she stands/is weight bearing. When seated or lying down her pain is relieved. It is primarily a severe back spasm pain that radiates to both legs b/l. This pain makes her lower extremeties "weak" and thus when these flares happen as in the last 2 days she gets around her house by crawling. No recent trauma, fevers, chills, dysuria, chest pain, dyspnea, cough, nausea, vomiting, diarrhea, constipation.     Of note, patient also separately mentioned that she has been having intermittent chills the last 2 days, and increased urination. She is unsure if this is due to medication changes (recently started taking Nifedipine at night only).

## 2018-12-14 NOTE — CONSULT NOTE ADULT - ATTENDING COMMENTS
Patient seen and examined and above note reviewed and I agree with assessment and plan as outlined. We will proceed with conservative pain control of lower thoracic paraspinal musculoskeletal pain with NSAIDS and muscle relaxer either flexeril or zanaflex as needed and lidocaine patch.  Ice and heat application and PT evaluation.  Please continue medical mgt and supportive care and please call with any additional questions.

## 2018-12-14 NOTE — H&P ADULT - PMH
Asthma    Cataract    HTN (hypertension)    Tendonitis  elbows Asthma    Cataract    HTN (hypertension)    Multiple sclerosis    Tendonitis  elbows

## 2018-12-14 NOTE — H&P ADULT - PROBLEM SELECTOR PLAN 7
IMPROVE VTE Individual Risk Assessment, Score = 1, can defer pharmacologic DVT ppx and c/w venodynes and PT for ambulation exercises           RISK                                                          Points  [  ] Previous VTE                                               3  [  ] Thrombophilia                                            2  [  ] Lower limb paresis/paralysis                    2    [  ] Active Cancer (in last 6 months)              2   [ x ] Immobilization > 24 hrs                             1  [  ] ICU/CCU stay > 24 hours                            1  [  ] Age > 60                                                        1                                            Total Score _________

## 2018-12-14 NOTE — PHYSICAL THERAPY INITIAL EVALUATION ADULT - ACTIVE RANGE OF MOTION EXAMINATION, REHAB EVAL
bilateral upper extremity Active ROM was WFL (within functional limits)/except right shoulder flexion 0-100/bilateral  lower extremity Active ROM was WFL (within functional limits)

## 2018-12-14 NOTE — CONSULT NOTE ADULT - ASSESSMENT
58y Female PMH Hypertension, Asthma, Multiple Sclerosis, L2-S1 Disc Herniations s/p Spinal Fusion Surgery, R shoulder surgery presents to [facility] ED for acute on chronic back pain.     Recs: 58y Female PMH Hypertension, Asthma, Multiple Sclerosis, L2-S1 Disc Herniations s/p Spinal Fusion Surgery, R shoulder surgery presents to [facility] ED for acute on chronic back pain. Patient without hyperreflexia, babinski on physical exam, current back pain consistent with prior episodes with herniated disk, symptoms not consistent with prior episodes of MS(numbness). Patient follows up with a neurologist outpatient, additional MRI evaluation scheduled for later in the month    Recs:  Continue pain management  No further neurologic workup inpatient for now 58y Female PMH Hypertension, Asthma, Multiple Sclerosis, L2-S1 Disc Herniations s/p Spinal Fusion Surgery, R shoulder surgery presents to [facility] ED for acute on chronic back pain. Patient without hyperreflexia, babinski on physical exam, current back pain consistent with prior episodes with herniated disk, symptoms not consistent with prior episodes of MS(numbness). Patient follows up with a neurologist outpatient, additional MRI evaluation scheduled for later in the month    Recs:  Continue pain management with muscle relaxer and NSAIDS as needed, heat and ice application  PT evaluation  No further neurologic workup inpatient for now and please call with any additional questions

## 2018-12-14 NOTE — H&P ADULT - NSHPREVIEWOFSYSTEMS_GEN_ALL_CORE
REVIEW OF SYSTEMS:    CONSTITUTIONAL: No weakness, fevers or chills  EYES/ENT: No visual changes;  No vertigo or throat pain   NECK: No pain or stiffness  RESPIRATORY: No cough, wheezing, hemoptysis; No shortness of breath  CARDIOVASCULAR: No chest pain or palpitations  GASTROINTESTINAL: No abdominal or epigastric pain. No nausea, vomiting, or hematemesis; No diarrhea or constipation. No melena or hematochezia.  GENITOURINARY: No dysuria, frequency or hematuria  NEUROLOGICAL: See HPI  SKIN: No itching, burning, rashes, or lesions   All other review of systems is negative unless indicated above. REVIEW OF SYSTEMS:    CONSTITUTIONAL: No weakness, fevers or chills  EYES/ENT: No visual changes;  No vertigo or throat pain   NECK: No pain or stiffness  RESPIRATORY: No cough, wheezing, hemoptysis; No shortness of breath  CARDIOVASCULAR: No chest pain or palpitations  GASTROINTESTINAL: No abdominal or epigastric pain. No nausea, vomiting, or hematemesis; No diarrhea or constipation. No melena or hematochezia.  GENITOURINARY: No dysuria, frequency or hematuria  NEUROLOGICAL: See HPI  : Polyuria  SKIN: No itching, burning, rashes, or lesions   All other review of systems is negative unless indicated above.

## 2018-12-14 NOTE — H&P ADULT - PROBLEM SELECTOR PLAN 4
Not in clinical asthma exacerbation, c/w Flovent as previously prescribed and Xopenex (not albuterol - gets tachycardic) PRN Hx of MS, however patient not on immunotherapy due to fear of side effects. Given muscle spasms and back pain possible MS flare, however given the positional nature more likely MSK, however given complex medical history will contact Neurology for input.

## 2018-12-14 NOTE — H&P ADULT - PROBLEM SELECTOR PLAN 3
c/w Nifedipine as previously prescribed, check BP q6h Patient to be transitioned to PO Ciprofloxacin for 3 day course of uncomplicated UTI (chills, polyuria, abnormal UA)

## 2018-12-14 NOTE — H&P ADULT - NSHPPHYSICALEXAM_GEN_ALL_CORE
Vital Signs Last 24 Hrs  T(C): 36.7 (14 Dec 2018 03:40), Max: 37.1 (14 Dec 2018 01:07)  T(F): 98.1 (14 Dec 2018 03:40), Max: 98.7 (14 Dec 2018 01:07)  HR: 71 (14 Dec 2018 03:40) (71 - 89)  BP: 140/82 (14 Dec 2018 03:40) (120/80 - 147/86)  BP(mean): --  RR: 18 (14 Dec 2018 03:40) (16 - 18)  SpO2: 100% (14 Dec 2018 03:40) (97% - 100%)    General: NAD, non-toxic appearing, speaking in full sentences   HEENT: EOMI, no conjunctival pallor, MMM, no JVD, no thyromegaly, neck supple, trachea midline  CV: S1S2 RRR no MRG  Lungs: CTA BL  Abdomen: soft NTND +BS   Extremities: No CCE +WWP  Skin/MSK: No rashes, limited ROM of R shoulder (hx of surgeries)  Neuro: AAOx3, non-focal Vital Signs Last 24 Hrs  T(C): 36.7 (14 Dec 2018 03:40), Max: 37.1 (14 Dec 2018 01:07)  T(F): 98.1 (14 Dec 2018 03:40), Max: 98.7 (14 Dec 2018 01:07)  HR: 71 (14 Dec 2018 03:40) (71 - 89)  BP: 140/82 (14 Dec 2018 03:40) (120/80 - 147/86)  BP(mean): --  RR: 18 (14 Dec 2018 03:40) (16 - 18)  SpO2: 100% (14 Dec 2018 03:40) (97% - 100%)    General: NAD, non-toxic appearing, speaking in full sentences   HEENT: EOMI, no conjunctival pallor, MMM, no JVD, no thyromegaly, neck supple, trachea midline  CV: S1S2 RRR no MRG  Lungs: CTA BL  Abdomen: soft NTND +BS   Extremities: No CCE +WWP  Skin/MSK: No rashes, limited ROM of R shoulder (hx of surgeries), (+) R lumbar paraspinal muscle spasm   Neuro: AAOx3, non-focal

## 2018-12-14 NOTE — ED ADULT NURSE REASSESSMENT NOTE - NS ED NURSE REASSESS COMMENT FT1
Pain assessment as noted in flow sheet. Pt states her back hurts. Pt given pain mediaction as per order. No complaints of chest pain, headache, nausea, dizziness, vomiting  SOB, fever, chills verbalized..  Awaiting CT results. will continue to monitor.

## 2018-12-14 NOTE — H&P ADULT - PROBLEM SELECTOR PLAN 1
Patient with acute on chronic back pain likely a flare up from her underlying disc herniations. Pain is exacerbated when she stands or her back is in extension.   - PT Consult  - C/w Tizanidine and Lidocaine patches  - Toradol 15 mg IV q6h PRN Severe Back Pain   - Contacted patient's pain management physician Dr. Armando Joe, awaiting call back Patient with acute on chronic back pain likely a flare up from her underlying disc herniations. Pain is exacerbated when she stands or her back is in extension.   - PT Consult  - C/w Tizanidine and Lidocaine patches  - Toradol 15 mg IV q6h PRN Severe Back Pain   - Contacted patient's pain management physician Dr. Armando Joe, he confirmed that patient has a long and complex orthopedic history with the lumbar disc herniations causing debilitating radiculopathy, shoulder arthroplasty, and knee repairs. Outpatient pain physician discussed that he utilizes Saline epidurals to help alleviate the patient's pain, as an alternative for spinal stimulation therapy. As per Dr. Joe, spinal stimulation was not recommended because of the cervical cord lesion finding years ago that eventually was diagnosed as Multiple Sclerosis, patient had refused immunotherapy treatment at the time due to fear of side effects. 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 Dr. Joe recommended for conventional acute low back treatment with pain control (NSAIDs/Tylenol/PT) and that he would meet the patient on Thursday, December 20 in his office for epidural therapy. Patient with acute on chronic back pain likely a flare up from her underlying disc herniations. Pain is exacerbated when she stands or her back is in extension.   - PT Consult  - C/w Flexeril for muscle spasms and Lidocaine patches  - c/w Toradol and Tylenol PRN   - Contacted patient's pain management physician Dr. Armando Joe, he confirmed that patient has a long and complex orthopedic history with the lumbar disc herniations causing debilitating radiculopathy, shoulder arthroplasty, and knee repairs. Outpatient pain physician discussed that he utilizes Saline epidurals to help alleviate the patient's pain, as an alternative for spinal stimulation therapy. As per Dr. Joe, spinal stimulation was not recommended because of the cervical cord lesion finding years ago that eventually was diagnosed as Multiple Sclerosis, patient had refused immunotherapy treatment at the time due to fear of side effects. 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 Dr. Joe recommended for conventional acute low back treatment with pain control (NSAIDs/Tylenol/PT) and that he would meet the patient on Thursday, December 20 in his office for epidural therapy. Patient with acute on chronic back pain likely a flare up from her underlying disc herniations. Pain is exacerbated when she stands or her back is in extension.   - PT Consult  - C/w Flexeril for muscle spasms and Lidocaine patches  - c/w Toradol and Tylenol PRN   - Contacted patient's pain management physician Dr. Armando Joe, he confirmed that patient has a long and complex orthopedic history with the lumbar disc herniations causing debilitating radiculopathy. Also confirmed hx of shoulder arthroplasty, and knee repairs. Outpatient pain physician discussed that he utilizes Saline epidurals to help alleviate the patient's pain, as an alternative for spinal stimulation therapy. As per Dr. Joe, spinal stimulation was not recommended because of the cervical cord lesion finding years ago that eventually was diagnosed as Multiple Sclerosis, patient had refused immunotherapy treatment at the time due to fear of side effects. 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 Dr. Joe recommended for conventional acute low back treatment with pain control (NSAIDs/Tylenol/PT) and that he would meet the patient on Thursday, December 20 in his office for epidural therapy.

## 2018-12-14 NOTE — H&P ADULT - FAMILY HISTORY
Mother  Still living? Unknown  Family history of breast cancer in mother, Age at diagnosis: Age Unknown     Father  Still living? Unknown  Family history of pancreatic cancer, Age at diagnosis: Age Unknown

## 2018-12-14 NOTE — H&P ADULT - PROBLEM SELECTOR PLAN 5
IMPROVE VTE Individual Risk Assessment, Score = 1, can defer pharmacologic DVT ppx and c/w venodynes and PT for ambulation exercises           RISK                                                          Points  [  ] Previous VTE                                               3  [  ] Thrombophilia                                            2  [  ] Lower limb paresis/paralysis                    2    [  ] Active Cancer (in last 6 months)              2   [ x ] Immobilization > 24 hrs                             1  [  ] ICU/CCU stay > 24 hours                            1  [  ] Age > 60                                                        1                                            Total Score _________ c/w Nifedipine as previously prescribed, check BP q6h

## 2018-12-15 ENCOUNTER — TRANSCRIPTION ENCOUNTER (OUTPATIENT)
Age: 58
End: 2018-12-15

## 2018-12-15 VITALS
TEMPERATURE: 98 F | RESPIRATION RATE: 18 BRPM | OXYGEN SATURATION: 97 % | DIASTOLIC BLOOD PRESSURE: 88 MMHG | SYSTOLIC BLOOD PRESSURE: 159 MMHG | HEART RATE: 78 BPM

## 2018-12-15 LAB
BACTERIA UR CULT: SIGNIFICANT CHANGE UP
SPECIMEN SOURCE: SIGNIFICANT CHANGE UP

## 2018-12-15 PROCEDURE — 99239 HOSP IP/OBS DSCHRG MGMT >30: CPT

## 2018-12-15 RX ORDER — POLYETHYLENE GLYCOL 3350 17 G/17G
17 POWDER, FOR SOLUTION ORAL
Qty: 0 | Refills: 0 | COMMUNITY
Start: 2018-12-15

## 2018-12-15 RX ORDER — TIZANIDINE 4 MG/1
1 TABLET ORAL
Qty: 0 | Refills: 0 | COMMUNITY

## 2018-12-15 RX ORDER — ACETAMINOPHEN 500 MG
1000 TABLET ORAL ONCE
Qty: 0 | Refills: 0 | Status: COMPLETED | OUTPATIENT
Start: 2018-12-15 | End: 2018-12-15

## 2018-12-15 RX ORDER — CIPROFLOXACIN LACTATE 400MG/40ML
1 VIAL (ML) INTRAVENOUS
Qty: 4 | Refills: 0 | OUTPATIENT
Start: 2018-12-15 | End: 2018-12-16

## 2018-12-15 RX ORDER — CYCLOBENZAPRINE HYDROCHLORIDE 10 MG/1
1 TABLET, FILM COATED ORAL
Qty: 15 | Refills: 0 | OUTPATIENT
Start: 2018-12-15 | End: 2018-12-19

## 2018-12-15 RX ADMIN — LIDOCAINE 1 PATCH: 4 CREAM TOPICAL at 10:57

## 2018-12-15 RX ADMIN — Medication 100 MILLIGRAM(S): at 13:07

## 2018-12-15 RX ADMIN — Medication 1000 MILLIGRAM(S): at 04:45

## 2018-12-15 RX ADMIN — FAMOTIDINE 20 MILLIGRAM(S): 10 INJECTION INTRAVENOUS at 07:16

## 2018-12-15 RX ADMIN — Medication 15 MILLIGRAM(S): at 14:04

## 2018-12-15 RX ADMIN — Medication 10 MILLIGRAM(S): at 14:33

## 2018-12-15 RX ADMIN — Medication 15 MILLIGRAM(S): at 03:15

## 2018-12-15 RX ADMIN — Medication 250 MILLIGRAM(S): at 07:16

## 2018-12-15 RX ADMIN — CYCLOBENZAPRINE HYDROCHLORIDE 10 MILLIGRAM(S): 10 TABLET, FILM COATED ORAL at 07:16

## 2018-12-15 RX ADMIN — Medication 250 MILLIGRAM(S): at 18:05

## 2018-12-15 RX ADMIN — FAMOTIDINE 20 MILLIGRAM(S): 10 INJECTION INTRAVENOUS at 18:06

## 2018-12-15 RX ADMIN — Medication 400 MILLIGRAM(S): at 04:31

## 2018-12-15 RX ADMIN — Medication 15 MILLIGRAM(S): at 02:58

## 2018-12-15 RX ADMIN — Medication 15 MILLIGRAM(S): at 13:48

## 2018-12-15 RX ADMIN — Medication 100 MILLIGRAM(S): at 07:16

## 2018-12-15 RX ADMIN — POLYETHYLENE GLYCOL 3350 17 GRAM(S): 17 POWDER, FOR SOLUTION ORAL at 10:57

## 2018-12-15 NOTE — DISCHARGE NOTE ADULT - PROVIDER TOKENS
FREE:[LAST:[Dr. Angela Pike],PHONE:[(   )    -],FAX:[(   )    -]],FREE:[LAST:[Dr. Joe - karlos],PHONE:[(   )    -],FAX:[(   )    -]]

## 2018-12-15 NOTE — DISCHARGE NOTE ADULT - HOSPITAL COURSE
58F hx of Hypertension, Asthma, MS, L2-S1 Disc Herniations s/p Spinal Fusion Surgery, R shoulder surgery being admitted for acute on chronic intractable back pain   Intractable back pain.  Plan: Patient with acute on chronic back pain likely a flare up from her underlying disc herniations. Pain is exacerbated when she stands or her back is in extension.   - PT Consult-PT w/Outpt services  - C/w Flexeril for muscle spasms and Lidocaine patches  - c/w Toradol and Tylenol PRN   - Contacted patient's pain management physician Dr. Armando Joe, he confirmed that patient has a long and complex orthopedic history with the lumbar disc herniations causing debilitating radiculopathy, shoulder arthroplasty, and knee repairs. Outpatient pain physician discussed that he utilizes Saline epidurals to help alleviate the patient's pain, as an alternative for spinal stimulation therapy. As per Dr. Joe, spinal stimulation was not recommended because of the cervical cord lesion finding years ago that eventually was diagnosed as Multiple Sclerosis, patient had refused immunotherapy treatment at the time due to fear of side effects. 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 No further neurologic workup inpatient for now    Unable to ambulate.  Plan: Patient says unable to ambulate for now because of back pain.   - PT consult-Home w/Oupt services  - Pain control.     UTI   -Patient to be transitioned to PO Ciprofloxacin for 3 day course of uncomplicated UTI (chills, polyuria, abnormal UA).     Essential hypertension  c/w Nifedipine as previously prescribed, check BP q6h     Multiple sclerosis  -Hx of MS, however patient not on immunotherapy due to fear of side effects. Given muscle spasms and back pain possible MS flare, however given the positional nature more likely MSK, however given complex medical history will contact Neurology for input.     Asthma. Plan: Not in clinical asthma exacerbation, c/w Flovent as previously prescribed and Xopenex (not albuterol - gets tachycardic) PRN.    Constipation  -start colace, senna, Miralax      dispo: home with outpatient PT and rollator - prescriptions provided

## 2018-12-15 NOTE — PROGRESS NOTE ADULT - PROBLEM SELECTOR PLAN 3
Patient to be transitioned to PO Ciprofloxacin for 3 day course of uncomplicated UTI (chills, polyuria, abnormal UA)

## 2018-12-15 NOTE — PROGRESS NOTE ADULT - PROBLEM SELECTOR PLAN 4
Hx of MS, however patient not on immunotherapy due to fear of side effects. Given muscle spasms and back pain possible MS flare, however given the positional nature more likely MSK, however given complex medical history Neurology was contacted , recommend outpt f/u with her neurologist

## 2018-12-15 NOTE — DISCHARGE NOTE ADULT - PATIENT PORTAL LINK FT
You can access the [facility], offered by [facility], by registering with the following website: [email or web address]

## 2018-12-15 NOTE — DISCHARGE NOTE ADULT - MEDICATION SUMMARY - MEDICATIONS TO STOP TAKING
I will STOP taking the medications listed below when I get home from the hospital:    tiZANidine 4 mg oral tablet  -- 1 tab(s) by mouth every 8 hours

## 2018-12-15 NOTE — DISCHARGE NOTE ADULT - CARE PROVIDER_API CALL
Dr. Angela Pike,   Phone: (   )    -  Fax: (   )    -    Dr. Joe - karlos,   Phone: (   )    -  Fax: (   )    -

## 2018-12-15 NOTE — PROGRESS NOTE ADULT - PROBLEM SELECTOR PLAN 1
Patient with acute on chronic back pain likely a flare up from her underlying disc herniations. Pain is exacerbated when she stands or her back is in extension.   - PT Consult noted, recommend outpt PT   - C/w Flexeril for muscle spasms and Lidocaine patches  - c/w Toradol and Tylenol PRN   -  patient's pain management physician Dr. Armando Joe confirmed that patient has a long and complex orthopedic history with the lumbar disc herniations causing debilitating radiculopathy. Also confirmed hx of shoulder arthroplasty, and knee repairs. Outpatient pain physician discussed that he utilizes Saline epidurals to help alleviate the patient's pain, as an alternative for spinal stimulation therapy. As per Dr. Joe, spinal stimulation was not recommended because of the cervical cord lesion finding years ago that eventually was diagnosed as Multiple Sclerosis, patient had refused immunotherapy treatment at the time due to fear of side effects. 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 Dr. Joe recommended for conventional acute low back treatment with pain control (NSAIDs/Tylenol/PT) and that he would meet the patient on Thursday, December 20 in his office for epidural therapy.

## 2018-12-15 NOTE — DISCHARGE NOTE ADULT - CARE PLAN
Principal Discharge DX:	Back pain  Secondary Diagnosis:	UTI (urinary tract infection)  Assessment and plan of treatment:	Continue antibiotics until completed  Secondary Diagnosis:	Multiple sclerosis  Assessment and plan of treatment:	Follow up with your neurologist outpatient wtin 1 month from discharge  Secondary Diagnosis:	HTN (hypertension)  Assessment and plan of treatment:	Continue blood pressure medication regimen as directed. Monitor for any visual changes, headaches or dizziness.  Monitor blood pressure regularly.  Follow up with your PCP for further management for high blood pressure. Principal Discharge DX:	Back pain  Goal:	be able to ambulate and free from worsening pain  Assessment and plan of treatment:	Continue flexeril  Participate in outpatient physical therapy  Secondary Diagnosis:	UTI (urinary tract infection)  Assessment and plan of treatment:	Continue antibiotics until completed  Secondary Diagnosis:	Multiple sclerosis  Assessment and plan of treatment:	Follow up with your neurologist outpatient within 1 month from discharge  Secondary Diagnosis:	HTN (hypertension)  Assessment and plan of treatment:	Continue blood pressure medication regimen as directed. Monitor for any visual changes, headaches or dizziness.  Monitor blood pressure regularly.  Follow up with your PCP for further management for high blood pressure.  Secondary Diagnosis:	Constipation  Assessment and plan of treatment:	Continue laxative therapy miralax and colace

## 2018-12-15 NOTE — DISCHARGE NOTE ADULT - MEDICATION SUMMARY - MEDICATIONS TO TAKE
I will START or STAY ON the medications listed below when I get home from the hospital:    meloxicam 15 mg oral tablet  -- 1 tab(s) by mouth once a day  -- Indication: For Pain    Tylenol 500 mg oral tablet  -- 2 tab(s) by mouth every 6 hours  -- Indication: For Pain as needed    loratadine 10 mg oral tablet  -- 1 tab(s) by mouth once a day  -- Indication: For Allergy    NIFEdipine 90 mg oral tablet, extended release  -- 1 tab(s) by mouth once a day  -- Indication: For Hypertension    polyethylene glycol 3350 oral powder for reconstitution  -- 17 gram(s) by mouth once a day  -- Indication: For Constipation    cyclobenzaprine 10 mg oral tablet  -- 1 tab(s) by mouth 3 times a day, As needed, Muscle Spasm  -- Indication: For Back pain    omeprazole 40 mg oral delayed release capsule  -- 1 cap(s) by mouth once a day  -- Indication: For GI ppx    ciprofloxacin 250 mg oral tablet  -- 1 tab(s) by mouth every 12 hours  -- Indication: For UTI (urinary tract infection)    fluticasone propionate  -- 1 spray(s) inhaled once a day  -- Indication: For Allergy

## 2018-12-15 NOTE — DISCHARGE NOTE ADULT - PLAN OF CARE
Continue antibiotics until completed Follow up with your neurologist outpatient wtihin 1 month from discharge Continue blood pressure medication regimen as directed. Monitor for any visual changes, headaches or dizziness.  Monitor blood pressure regularly.  Follow up with your PCP for further management for high blood pressure. be able to ambulate and free from worsening pain Continue flexeril  Participate in outpatient physical therapy Follow up with your neurologist outpatient within 1 month from discharge Continue laxative therapy miralax and colace

## 2018-12-15 NOTE — PROGRESS NOTE ADULT - ATTENDING COMMENTS
Pt c/o constipation, will try dulcolax suppository,  DC planning if constipation resolves and pain better controlled

## 2018-12-15 NOTE — PROGRESS NOTE ADULT - SUBJECTIVE AND OBJECTIVE BOX
Patient is a 58y old  Female who presents with a chief complaint of Acute on Chronic Back Pain (15 Dec 2018 11:47)      SUBJECTIVE / OVERNIGHT EVENTS: patient seen and examined by bedside, c/o abdominal cramps, pt says she is constipated,   back pain has improved , denies numbness, tingling       MEDICATIONS  (STANDING):  ciprofloxacin     Tablet 250 milliGRAM(s) Oral every 12 hours  docusate sodium 100 milliGRAM(s) Oral three times a day  famotidine    Tablet 20 milliGRAM(s) Oral two times a day  influenza   Vaccine 0.5 milliLiter(s) IntraMuscular once  lidocaine   Patch 1 Patch Transdermal daily  NIFEdipine XL 90 milliGRAM(s) Oral at bedtime  polyethylene glycol 3350 17 Gram(s) Oral daily  senna 2 Tablet(s) Oral at bedtime    MEDICATIONS  (PRN):  acetaminophen   Tablet .. 650 milliGRAM(s) Oral every 6 hours PRN Moderate Pain (4 - 6)  cyclobenzaprine 10 milliGRAM(s) Oral three times a day PRN Muscle Spasm  ketorolac   Injectable 15 milliGRAM(s) IV Push every 6 hours PRN Severe Pain (7 - 10)      Vital Signs Last 24 Hrs  T(C): 36.7 (15 Dec 2018 06:33), Max: 37.2 (14 Dec 2018 21:30)  T(F): 98.1 (15 Dec 2018 06:33), Max: 99 (14 Dec 2018 21:30)  HR: 78 (15 Dec 2018 06:33) (78 - 82)  BP: 159/88 (15 Dec 2018 06:33) (142/88 - 159/88)  BP(mean): --  RR: 18 (15 Dec 2018 06:33) (18 - 18)  SpO2: 97% (15 Dec 2018 06:33) (97% - 100%)  CAPILLARY BLOOD GLUCOSE        I&O's Summary      PHYSICAL EXAM:  GENERAL: NAD, well-developed  HEAD:  Atraumatic, Normocephalic  EYES: EOMI, PERRLA, conjunctiva and sclera clear  NECK: Supple,  CHEST/LUNG: Clear to auscultation bilaterally; No wheeze  HEART: Regular rate and rhythm;   ABDOMEN: Soft, Nontender, Nondistended; Bowel sounds present  EXTREMITIES:  2+ Peripheral Pulses, No clubbing, cyanosis, or edema  PSYCH: AAOx3  NEUROLOGY: non-focal  SKIN: No rashes or lesions    LABS:                        .1   5.22  )-----------( 280      ( 13 Dec 2018 19:17 )             42.7     12-13    144  |  106  |  12  ----------------------------<  92  3.4<L>   |  28  |  0.81    Ca    9.7      13 Dec 2018 19:17    TPro  7.7  /  Alb  4.4  /  TBili  < 0.2<L>  /  DBili  x   /  AST  20  /  ALT  12  /  AlkPhos  119  12-          Urinalysis Basic - ( 13 Dec 2018 19:29 )    Color: COLORLESS / Appearance: CLEAR / S.010 / pH: 7.5  Gluc: NEGATIVE / Ketone: NEGATIVE  / Bili: NEGATIVE / Urobili: NORMAL   Blood: NEGATIVE / Protein: NEGATIVE / Nitrite: NEGATIVE   Leuk Esterase: LARGE / RBC: 0-2 / WBC 25-50   Sq Epi: x / Non Sq Epi: SMALL / Bacteria: SMALL          Consultant(s) Notes Reviewed:  neurology

## 2018-12-18 DIAGNOSIS — Z71.89 OTHER SPECIFIED COUNSELING: ICD-10-CM

## 2019-04-08 NOTE — ED ADULT NURSE NOTE - CAS TRG GEN SKIN CONDITION
Warm Is This A New Presentation, Or A Follow-Up?: Skin Lesions What Type Of Note Output Would You Prefer (Optional)?: Standard Output How Severe Is Your Skin Lesion?: moderate Has Your Skin Lesion Been Treated?: not been treated Additional History: Had some similar plaices but they healed up

## 2020-02-01 PROCEDURE — G9005: CPT

## 2020-10-27 NOTE — ED PROVIDER NOTE - CPE EDP NEURO NORM
"Spoke with pt, confirmed she received return to work form by e-mail. Pt reported "I feel very good", getting fluids and protein as expected and with out complaints.  Instructed pt to let me know of any further questions or concerns.  " - - -

## 2022-07-14 NOTE — ED ADULT NURSE NOTE - NS ED NOTE ABUSE SUSPICION NEGLECT YN
materni T21 today.  Patient states she is feeling well and has had no nausea or vomiting.  She states she went to the emergency room at [facility] due to cramping.  Was told she had a UTI and was given antibiotics.  Fetal well-being reassuring via ultrasound.  All questions answered and precautions given.  Return to clinic in 4 weeks  
No

## 2023-05-09 ENCOUNTER — EMERGENCY (EMERGENCY)
Facility: HOSPITAL | Age: 63
LOS: 1 days | Discharge: HOME | End: 2023-05-09
Attending: STUDENT IN AN ORGANIZED HEALTH CARE EDUCATION/TRAINING PROGRAM
Payer: COMMERCIAL

## 2023-05-09 VITALS
SYSTOLIC BLOOD PRESSURE: 173 MMHG | TEMPERATURE: 98 F | RESPIRATION RATE: 16 BRPM | DIASTOLIC BLOOD PRESSURE: 92 MMHG | HEART RATE: 95 BPM | OXYGEN SATURATION: 98 %

## 2023-05-09 VITALS
RESPIRATION RATE: 20 BRPM | HEART RATE: 101 BPM | DIASTOLIC BLOOD PRESSURE: 101 MMHG | OXYGEN SATURATION: 97 % | SYSTOLIC BLOOD PRESSURE: 176 MMHG | HEIGHT: 64.5 IN | WEIGHT: 147.93 LBS | TEMPERATURE: 98 F

## 2023-05-09 DIAGNOSIS — Z98.890 OTHER SPECIFIED POSTPROCEDURAL STATES: Chronic | ICD-10-CM

## 2023-05-09 DIAGNOSIS — Z98.1 ARTHRODESIS STATUS: Chronic | ICD-10-CM

## 2023-05-09 PROCEDURE — 99284 EMERGENCY DEPT VISIT MOD MDM: CPT

## 2023-05-09 PROCEDURE — 99283 EMERGENCY DEPT VISIT LOW MDM: CPT | Mod: 25

## 2023-05-09 PROCEDURE — 96372 THER/PROPH/DIAG INJ SC/IM: CPT

## 2023-05-09 RX ORDER — IBUPROFEN 200 MG
600 TABLET ORAL ONCE
Refills: 0 | Status: DISCONTINUED | OUTPATIENT
Start: 2023-05-09 | End: 2023-05-09

## 2023-05-09 RX ORDER — KETOROLAC TROMETHAMINE 30 MG/ML
30 SYRINGE (ML) INJECTION ONCE
Refills: 0 | Status: DISCONTINUED | OUTPATIENT
Start: 2023-05-09 | End: 2023-05-09

## 2023-05-09 RX ORDER — KETOROLAC TROMETHAMINE 30 MG/ML
15 SYRINGE (ML) INJECTION ONCE
Refills: 0 | Status: DISCONTINUED | OUTPATIENT
Start: 2023-05-09 | End: 2023-05-09

## 2023-05-09 RX ORDER — LIDOCAINE 4 G/100G
1 CREAM TOPICAL ONCE
Refills: 0 | Status: COMPLETED | OUTPATIENT
Start: 2023-05-09 | End: 2023-05-09

## 2023-05-09 RX ORDER — ACETAMINOPHEN 500 MG
975 TABLET ORAL ONCE
Refills: 0 | Status: COMPLETED | OUTPATIENT
Start: 2023-05-09 | End: 2023-05-09

## 2023-05-09 RX ADMIN — Medication 30 MILLIGRAM(S): at 23:40

## 2023-05-09 RX ADMIN — LIDOCAINE 1 PATCH: 4 CREAM TOPICAL at 23:36

## 2023-05-09 NOTE — ED PROVIDER NOTE - NSFOLLOWUPINSTRUCTIONS_ED_ALL_ED_FT
- stay hydrated.   -take all home medications as prescribed  - take tylenol 975mg and ibuprofen 600mg every 6 hours as needed for pain-take with meals.  - follow up with your primary care provider in 1-2 days.    - return if symptoms worsen, fever, weakness, numbness/tingling, blurred vision, difficulty ambulating and all other concerns.

## 2023-05-09 NOTE — ED ADULT NURSE NOTE - NSICDXFAMILYHX_GEN_ALL_CORE_FT
FAMILY HISTORY:  Father  Still living? Unknown  Family history of pancreatic cancer, Age at diagnosis: Age Unknown    Mother  Still living? Unknown  Family history of breast cancer in mother, Age at diagnosis: Age Unknown
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
## 2023-05-09 NOTE — ED PROVIDER NOTE - PATIENT PORTAL LINK FT
You can access the FollowMyHealth Patient Portal offered by [facility] by registering at the following website: [email or web address]. By joining [facility]’s FollowMyHealth portal, you will also be able to view your health information using other applications (apps) compatible with our system.
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
## 2023-05-09 NOTE — ED PROVIDER NOTE - PROGRESS NOTE DETAILS
pt endorses improvement in pain, ambulatory with walker and steady gait, all questions answered. -ROBYN PotterC

## 2023-05-09 NOTE — ED ADULT NURSE NOTE - NSICDXPASTSURGICALHX_GEN_ALL_CORE_FT
PAST SURGICAL HISTORY:  History of arthroscopic knee surgery     History of lumbar fusion     History of shoulder surgery

## 2023-05-09 NOTE — ED ADULT NURSE NOTE - NSICDXPASTMEDICALHX_GEN_ALL_CORE_FT
PAST MEDICAL HISTORY:  Asthma     Cataract     HTN (hypertension)     Multiple sclerosis     Tendonitis elbows

## 2023-05-09 NOTE — ED PROVIDER NOTE - NS ED ATTENDING STATEMENT MOD
This was a shared visit with the CASS. I reviewed and verified the documentation and independently performed the documented:

## 2023-05-09 NOTE — ED PROVIDER NOTE - ATTENDING APP SHARED VISIT CONTRIBUTION OF CARE
I, Emerald Forbes, performed a history and physical exam of the patient and discussed their management with the advanced care provider. I reviewed the note and agree with the documented findings and plan of care. I was present and available for all procedures.    ---    Please see MDM

## 2023-05-10 PROBLEM — G35 MULTIPLE SCLEROSIS: Chronic | Status: ACTIVE | Noted: 2018-12-14

## 2024-12-23 NOTE — ED ADULT TRIAGE NOTE - RESPIRATORY RATE (BREATHS/MIN)
[facility] Progress Note    2024     Kiera Irvin    MRN: [id number]    : 1957    Referring MD: Eileen Palomino, APRN - NP  [address] [address]      Subjective: Patient is doing welI. She is happy to have the [facility] and  day off from visits. She will return for her next visit on 24.        ECOG PS:  (1) Restricted in physically strenuous activity, ambulatory and able to do work of light nature    KPS: 90% Able to carry on normal activity; minor signs or symptoms of disease    Isolation: None    Medications    Scheduled Meds:   sodium chloride  1,000 mL IntraVENous Once    ALTEplase (CATHFLO) 2 mg in sterile water 2 mL injection  2 mg IntraCATHeter Once           Continuous Infusions:   sodium chloride               PRN Meds:.sodium chloride, potassium chloride, potassium chloride, magnesium sulfate, magnesium hydroxide, oxyCODONE, oxyCODONE, prochlorperazine, prochlorperazine, ondansetron, ondansetron    ROS:  As noted above, otherwise remainder of 10-point ROS negative    Physical Exam:     I&O:  No intake or output data in the 24 hours ending 24 1015            Vital Signs:  /78   Pulse 84   Temp 97.7 °F (36.5 °C) (Oral)   Resp 16   Wt 104 kg (229 lb 4.5 oz)   SpO2 96%   BMI 40.29 kg/m²     Weight:    Wt Readings from Last 3 Encounters:   24 104 kg (229 lb 4.5 oz)   24 104.2 kg (229 lb 11.5 oz)   24 104.1 kg (229 lb 8 oz)       PE performed by Dr. Camacho:   General: Awake, alert and oriented.  HEENT: normocephalic, PERRL, no scleral erythema or icterus, Oral mucosa moist and intact, throat clear  NECK: supple  BACK: Straight   SKIN: warm dry and intact without lesions rashes or masses  CHEST: CTA bilaterally without use of accessory muscles  CV: Normal S1 S2, RRR, no MRG  ABD: NT ND normoactive BS  EXTREMITIES: without edema, denies calf tenderness  NEURO: CN II - XII grossly intact  CATHETER: PAC     Data   16